# Patient Record
Sex: FEMALE | Race: WHITE
[De-identification: names, ages, dates, MRNs, and addresses within clinical notes are randomized per-mention and may not be internally consistent; named-entity substitution may affect disease eponyms.]

---
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
## 2021-05-03 ENCOUNTER — HOSPITAL ENCOUNTER (EMERGENCY)
Dept: HOSPITAL 63 - ER | Age: 21
Discharge: HOME | End: 2021-05-03
Payer: COMMERCIAL

## 2021-05-03 VITALS — DIASTOLIC BLOOD PRESSURE: 85 MMHG | SYSTOLIC BLOOD PRESSURE: 150 MMHG

## 2021-05-03 VITALS — BODY MASS INDEX: 35.94 KG/M2 | WEIGHT: 210.54 LBS | HEIGHT: 64 IN
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
## 2021-05-03 DIAGNOSIS — S82.891A: ICD-10-CM

## 2021-05-03 DIAGNOSIS — S82.51XA: Primary | ICD-10-CM
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
## 2021-05-03 PROCEDURE — 73590 X-RAY EXAM OF LOWER LEG: CPT

## 2021-05-03 PROCEDURE — 29515 APPLICATION SHORT LEG SPLINT: CPT

## 2021-05-03 PROCEDURE — 99284 EMERGENCY DEPT VISIT MOD MDM: CPT

## 2021-05-03 PROCEDURE — 73610 X-RAY EXAM OF ANKLE: CPT

## 2021-05-03 NOTE — RAD
Exam: Right tibia and fibula 2 views



INDICATION: Pain



TECHNIQUE: Frontal and lateral views of the right tibia and fibula



Comparisons: Ankle radiographs same day



FINDINGS:

Bone mineralization is normal. Mildly displaced fracture of the posterior malleolus is noted. The kno
wn medial malleolus fracture is difficult to see. Soft tissues are unremarkable. Joint spaces are wel
l-maintained.



IMPRESSION:

Redemonstration of posterior malleolus fracture to the malleolus fracture. No other fractures are heather
ntified.



Electronically signed by: Thu Sanchez MD (5/3/2021 7:51 PM) [facility]

## 2021-05-03 NOTE — RAD
Study: XR EXAM OF ANKLE_RIGHT [id number]



Indication: Ankle injury.



Comparison: None.



Findings:



Obliquely oriented, nondisplaced fracture at the base of the medial malleolus. Mild widening of the d
istal syndesmosis on the oblique view. Nondisplaced fracture of the posterior malleolus. No discrete 
fracture seen throughout the partially assessed foot.



Circumferential soft tissue swelling at the ankle and an ankle joint effusion.



Impression:



Nondisplaced fracture at the base of the medial malleolus and a nondisplaced fracture of the posterio
r malleolus. On the AP oblique view the distal syndesmosis is widened suggesting syndesmotic injury. 
Recommend radiographs of the tibia/fibula to exclude a proximal fibula fracture. 



Electronically signed by: AIDAN MATTHEWS MD (5/3/2021 7:20 PM) [facility]

## 2021-05-03 NOTE — PHYS DOC
Past History


Past Medical History:  Anxiety, Depression


Past Surgical History:  No Surgical History


Additional Past Surgical Histo:  hernia x2


Alcohol Use:  None


Drug Use:  None





General Adult


EDM:


Chief Complaint:  LOWEREXTREMITY INJURY





HPI:


HPI:





Patient is a 20-year-old female who states that she was jumping off of 

playground equipment when she jumped onto her ground and is having right ankle 

pain.  Denies pain anywhere else.  Range of motion is intact.  Patient has 

history of anxiety and depression.





Review of Systems:


Review of Systems:


Constitutional:  Denies fever or chills 


Eyes:  Denies change in visual acuity 


HENT:  Denies nasal congestion or sore throat 


Respiratory:  Denies cough or shortness of breath 


Cardiovascular:  Denies chest pain or edema 


GI:  Denies abdominal pain, nausea, vomiting, bloody stools or diarrhea 


: Denies dysuria 


Musculoskeletal: Reports right ankle pain


Integument:  Denies rash 


Neurologic:  Denies headache, focal weakness or sensory changes 


Endocrine:  Denies polyuria or polydipsia 


Lymphatic:  Denies swollen glands 


Psychiatric:  Denies depression or anxiety





Allergies:


Allergies:





Allergies








Coded Allergies Type Severity Reaction Last Updated Verified


 


  No Known Drug Allergies    7/7/20 No











Physical Exam:


PE:





Constitutional: Well developed, well nourished, no acute distress, non-toxic 

appearance. []


HENT: Normocephalic, atraumatic, bilateral external ears normal, oropharynx 

moist, no oral exudates, nose normal. []


Eyes: PERRLA, EOMI, conjunctiva normal, no discharge. [] 


Neck: Normal range of motion, no tenderness, supple, no stridor. [] 


Cardiovascular:Heart rate regular rhythm, no murmur []


Lungs & Thorax:  Bilateral breath sounds clear to auscultation []


Abdomen: Bowel sounds normal, soft, no tenderness, no masses, no pulsatile 

masses. [] 


Skin: Warm, dry, no erythema, no rash. [] 


Back: No tenderness, no CVA tenderness. [] 


Extremities: Right ankle  tenderness,  ROM intact, swelling


Neurologic: Alert and oriented X 3, normal motor function, normal sensory 

function, no focal deficits noted. []


Psychologic: Affect normal, judgement normal, mood normal. []





Current Patient Data:


Vital Signs:





                                   Vital Signs








  Date Time  Temp Pulse Resp B/P (MAP) Pulse Ox O2 Delivery O2 Flow Rate FiO2


 


5/3/21 18:42 98.4 100 18 170/97 (121) 97 Room Air  











EKG:


EKG:


[]





Radiology/Procedures:


Radiology/Procedures:


[]Study: XR EXAM OF ANKLE_RIGHT 3VIEWS





Indication: Ankle injury.





Comparison: None.





Findings:





Obliquely oriented, nondisplaced fracture at the base of the medial malleolus. 

Mild widening of the distal syndesmosis on the oblique view. Nondisplaced 

fracture of the posterior malleolus. No discrete fracture seen throughout the 

partially assessed foot.





Circumferential soft tissue swelling at the ankle and an ankle joint effusion.





Impression:





Nondisplaced fracture at the base of the medial malleolus and a nondisplaced 

fracture of the posterior malleolus. On the AP oblique view the distal 

syndesmosis is widened suggesting syndesmotic injury. Recommend radiographs of 

the tibia/fibula to exclude a proximal fibula fracture. 





Electronically signed by: AIDAN MATTHEWS MD (5/3/2021 7:20 PM) [facility]


 

********************************************************************************


***********************************************************************


Exam: Right tibia and fibula 2 views





INDICATION: Pain





TECHNIQUE: Frontal and lateral views of the right tibia and fibula





Comparisons: Ankle radiographs same day





FINDINGS:


Bone mineralization is normal. Mildly displaced fracture of the posterior 

malleolus is noted. The known medial malleolus fracture is difficult to see. 

Soft tissues are unremarkable. Joint spaces are well-maintained.





IMPRESSION:


Redemonstration of posterior malleolus fracture to the malleolus fracture. No 

other fractures are identified.





Electronically signed by: Thu Sanchez MD (5/3/2021 7:51 PM) [facility]





Heart Score:


C/O Chest Pain:  No


Risk Factors:


Risk Factors:  DM, Current or recent (<one month) smoker, HTN, HLP, family 

history of CAD, obesity.


Risk Scores:


Score 0 - 3:  2.5% MACE over next 6 weeks - Discharge Home


Score 4 - 6:  20.3% MACE over next 6 weeks - Admit for Clinical Observation


Score 7 - 10:  72.7% MACE over next 6 weeks - Early Invasive Strategies





Course & Med Decision Making:


Course & Med Decision Making


Pertinent Labs and Imaging studies reviewed. (See chart for details)





[] Patient states that she was jumping off of a piece of playground equipment 

when she entered her right ankle.  X-ray of right ankle shows Nondisplaced 

fracture at the base of the medial malleolus and a nondisplaced fracture of the 

posterior malleolus.  Tib-fib x-ray ordered to exclude a proximal fibula 

fracture.  Tib-fib x-ray negative for proximal fibular fracture.  Short leg 

posterior splint ordered.  Patient instructed to use crutches and 

nonweightbearing.  Rice instructions given.  Patient needs to follow-up with 

Ortho in the next 5 to 7 days.  Patient given hydrocodone in the emergency room 

to treat pain.  Patient is also sent home with a prescription for hydrocodone un

til she can follow-up with Ortho.  Patient states that she understands discharge

 instructions.





Dragon Disclaimer:


Dragon Disclaimer:


This electronic medical record was generated, in whole or in part, using a voice

 recognition dictation system.





Departure


Departure:


Impression:  


   Primary Impression:  


   Fracture of medial malleolus, closed


   Qualified Codes:  S82.54XA - Nondisplaced fracture of medial malleolus of 

   right tibia, initial encounter for closed fracture


   Additional Impression:  


   Fracture, posterior malleolus


   Qualified Codes:  S82.391A - Other fracture of lower end of right tibia, 

   initial encounter for closed fracture


Disposition:  01 HOME / SELF CARE / HOMELESS


Condition:  STABLE


Referrals:  


PINA MONTEZ MD (PCP)


Patient Instructions:  Ankle Fracture, Easy-to-Read, RICE - Routine Care for 

Injuries, Easy-to-Read





Additional Instructions:  


You were seen here for ankle pain after a fall yesterday.  Your ankle x-ray did 

show a fracture.  We have applied a splint.  I'm also providing you with 

orthopedics phone number for you to follow-up.  You need to be nonweightbearing 

at this time and use crutches.  Rest use ice and elevate your leg to help reduce

 swelling.  You can also take ibuprofen at home for discomfort.  Sending you 

home with a prescription for hydrocodone as well.  Please call orthopedics 

tomorrow and let them know you were seen in the emergency room and you need a 

follow-up appointment.  Please return the emergency room with worsening symptoms

 or concerns.





[facility] orthopedic


[address]


[place] [address]


[phone]





EMERGENCY DEPARTMENT GENERAL DISCHARGE INSTRUCTIONS





Thank you for coming to [facility] (ED) today and trusting us

 with you 


care.  We trust that you had a positivie experience in our Emergency Department.

  If you 


wish to speak to the department management, you may call the director at 

[phone].





YOUR FOLLOW UP INSTRUCTIONS ARE AS FOLLOWS:





1.  Do you have a private Doctor?  If you do not have a private doctor, please 

ask for a 


resource list of physicians or clinics that may be able to assist you with 

follow up care.





2.  The Emergency Physician has interpreted your x-rays.  The X-Ray specialist 

will also 


review them.  If there is a change in the findings, you will be notified in 48 

hours when at 


all possible.





3.  A lab test or culture has been done, your results will be reviewed and you 

will be 


notified if you need a change in treatment.





ADDITIONAL INSTRUCTIONS AND INFORMATION:





1.  Your care today has been supervised by a physician who is specially trained 

in emergency 


care.  Many problems require more than one evaluation for a complete diagnosis 

and 


treatment.  We recommend that you schedule your follow up appointment as 

recommended to 


ensure complete treatment of you illness or injury.  If you are unable to obtain

 follow up 


care and continue to have a problem, or if your condition worsens, we recommend 

that you 


return to the ED.





2.  We are not able to safely determine your condition over the phone nor are we

 able to 


give sound medical advice over the phone.  For these safety reasons, if you call

 for medical 


advice we will ask you to come to the ED for further evaluation.





3.  If you have any questions regarding these discharge instructions please call

 the ED at 


[phone].





SAFETY INFORMATION:





In the interest of safety, wellness, and injury prevention; we encourage you to 

wear your 


sealbelt, if you smoke; quite smoking, and we encourage family to use a 

protective helmet 


for bicycling and other sporting events that present an increased risk for head 

injury.





IF YOUR SYMPTOMS WORSEN OR NEW SYMPTOMS DEVELOP, OR YOU HAVE CONCERNS ABOUT YOUR

 CONDITION; 


OR IF YOUR CONDITION WORSENS WHILE YOU ARE WAITING FOR YOUR FOLLOW UP 

APPOINTMENT; EITHER 


CONTACT YOUR PRIMARY CARE DOCTOR, THE PHYSICIAN WHOSE NAME AND NUMBER YOU WERE 

GIVEN, OR 


RETURN TO THE ED IMMEDIATELY.


Scripts


Hydrocodone Bit/Acetaminophen (HYDROCODONE-APAP 5-325  **) 1 Each Tablet


1 TAB PO PRN Q6HRS PRN for PAIN for 3 Days, #12 TAB 0 Refills


   Prov: RIGOBERTO BEARDEN         5/3/21











RIGOBERTO BEARDEN                May 3, 2021 19:32